# Patient Record
Sex: FEMALE | URBAN - METROPOLITAN AREA
[De-identification: names, ages, dates, MRNs, and addresses within clinical notes are randomized per-mention and may not be internally consistent; named-entity substitution may affect disease eponyms.]

---

## 2018-08-21 ENCOUNTER — OFFICE VISIT (OUTPATIENT)
Dept: URBAN - METROPOLITAN AREA CLINIC 76 | Facility: CLINIC | Age: 78
End: 2018-08-21
Payer: MEDICARE

## 2018-08-21 PROCEDURE — 92133 CPTRZD OPH DX IMG PST SGM ON: CPT | Performed by: OPHTHALMOLOGY

## 2018-08-21 PROCEDURE — 92083 EXTENDED VISUAL FIELD XM: CPT | Performed by: OPHTHALMOLOGY

## 2018-08-21 PROCEDURE — 92014 COMPRE OPH EXAM EST PT 1/>: CPT | Performed by: OPHTHALMOLOGY

## 2018-08-21 ASSESSMENT — INTRAOCULAR PRESSURE
OD: 14
OS: 13

## 2018-08-21 NOTE — IMPRESSION/PLAN
Impression: Diagnosis: Ocular hypertension, bilateral. Code: H40.053. IOP OU ok. Pt using Timolol solution, no GFS. VF and OCT reviewed, stable compared to last. Plan: Continue to monitor. Continue Timolol (Gel) QD OU, Brimonidine BID OU. Emphasized compliance. Advised pt to check with pharmacy regarding Timolol GFS.

## 2019-02-12 ENCOUNTER — OFFICE VISIT (OUTPATIENT)
Dept: URBAN - METROPOLITAN AREA CLINIC 76 | Facility: CLINIC | Age: 79
End: 2019-02-12
Payer: MEDICARE

## 2019-02-12 DIAGNOSIS — Z96.1 PRESENCE OF INTRAOCULAR LENS: ICD-10-CM

## 2019-02-12 PROCEDURE — 92012 INTRM OPH EXAM EST PATIENT: CPT | Performed by: OPHTHALMOLOGY

## 2019-02-12 ASSESSMENT — INTRAOCULAR PRESSURE
OD: 16
OS: 16

## 2019-02-12 NOTE — IMPRESSION/PLAN
Impression: Diagnosis: Ocular hypertension, bilateral. Code: H40.053. IOP OU higher today. Plan: Continue to monitor. Continue Timolol (Gel) QD OU, confirmed with patient she is taking Timolol GFS. advised patient to take  Brimonidine BID OU. Advised medication compliance. Will transfer care to Optometrist if stable at next visit.

## 2019-08-12 ENCOUNTER — OFFICE VISIT (OUTPATIENT)
Dept: URBAN - METROPOLITAN AREA CLINIC 76 | Facility: CLINIC | Age: 79
End: 2019-08-12
Payer: MEDICARE

## 2019-08-12 PROCEDURE — 92014 COMPRE OPH EXAM EST PT 1/>: CPT | Performed by: OPHTHALMOLOGY

## 2019-08-12 PROCEDURE — 92083 EXTENDED VISUAL FIELD XM: CPT | Performed by: OPHTHALMOLOGY

## 2019-08-12 PROCEDURE — 92133 CPTRZD OPH DX IMG PST SGM ON: CPT | Performed by: OPHTHALMOLOGY

## 2019-08-12 ASSESSMENT — VISUAL ACUITY
OS: 20/30
OD: 20/60

## 2019-08-12 ASSESSMENT — INTRAOCULAR PRESSURE
OS: 14
OD: 13

## 2019-08-12 NOTE — IMPRESSION/PLAN
Impression: Diagnosis: Ocular hypertension, bilateral. Code: H40.053. IOP OU controlled on current regimen. No changes needed. OCT stable compared to last. VF stable compared to last. Plan: Continue to monitor. Continue Timolol (Gel) QD OU, 
again advised patient to take Brimonidine BID OU. Advised medication compliance.  
Will transfer care to Optometrist

## 2020-05-26 ENCOUNTER — OFFICE VISIT (OUTPATIENT)
Dept: URBAN - METROPOLITAN AREA CLINIC 76 | Facility: CLINIC | Age: 80
End: 2020-05-26
Payer: MEDICARE

## 2020-05-26 DIAGNOSIS — H04.123 DRY EYE SYNDROME OF BILATERAL LACRIMAL GLANDS: ICD-10-CM

## 2020-05-26 DIAGNOSIS — H52.4 PRESBYOPIA: ICD-10-CM

## 2020-05-26 PROCEDURE — 92002 INTRM OPH EXAM NEW PATIENT: CPT | Performed by: OPTOMETRIST

## 2020-05-26 PROCEDURE — 92012 INTRM OPH EXAM EST PATIENT: CPT | Performed by: OPTOMETRIST

## 2020-05-26 RX ORDER — ALPRAZOLAM 0.5 MG/1
0.5 MG TABLET ORAL
Refills: 0 | Status: ACTIVE
Start: 2020-05-26

## 2020-05-26 ASSESSMENT — INTRAOCULAR PRESSURE
OS: 12
OD: 12

## 2020-05-26 NOTE — IMPRESSION/PLAN
Impression: Diagnosis: Ocular hypertension, bilateral. Code: H40.053. IOP OU controlled on current regimen. No changes needed. Plan: Continue to monitor. Continue Timolol (Gel) QD OU and Brimonidine BID OU. Advised medication compliance. Needs updated tests.

## 2020-05-26 NOTE — IMPRESSION/PLAN
Impression: Presbyopia: H52.4. Bilateral. Plan: Recommend updated mrx. Advised pt she does not meet legal driving requirements.

## 2020-12-28 ENCOUNTER — OFFICE VISIT (OUTPATIENT)
Dept: URBAN - METROPOLITAN AREA CLINIC 76 | Facility: CLINIC | Age: 80
End: 2020-12-28
Payer: MEDICARE

## 2020-12-28 DIAGNOSIS — H40.053 OCULAR HYPERTENSION, BILATERAL: Primary | ICD-10-CM

## 2020-12-28 PROCEDURE — 92083 EXTENDED VISUAL FIELD XM: CPT | Performed by: OPTOMETRIST

## 2020-12-28 PROCEDURE — 92133 CPTRZD OPH DX IMG PST SGM ON: CPT | Performed by: OPTOMETRIST

## 2020-12-28 PROCEDURE — 99213 OFFICE O/P EST LOW 20 MIN: CPT | Performed by: OPTOMETRIST

## 2020-12-28 ASSESSMENT — INTRAOCULAR PRESSURE
OD: 17
OS: 17

## 2020-12-28 NOTE — IMPRESSION/PLAN
Impression: Diagnosis: Ocular hypertension, bilateral. Code: H40.053. IOP high today, pt has not used Brimonidine yet today. OCT 12/28/20: Mild RNFL thinning OU, stable OU. VF 12/28/20: EHFP, unreliable OD. possible mild sup/inf nasal step OS. Dr. Brenner Eis on medical leave. Plan: Continue to monitor. Advised pt to use Timolol GFS QAM OU and continue Brimonidine BID OU. Advised medication compliance.

## 2021-04-28 ENCOUNTER — OFFICE VISIT (OUTPATIENT)
Dept: URBAN - METROPOLITAN AREA CLINIC 76 | Facility: CLINIC | Age: 81
End: 2021-04-28
Payer: MEDICARE

## 2021-04-28 PROCEDURE — 99213 OFFICE O/P EST LOW 20 MIN: CPT | Performed by: OPTOMETRIST

## 2021-04-28 RX ORDER — TIMOLOL MALEATE 5 MG/ML
0.5 % SOLUTION, GEL FORMING, EXTENDED RELEASE OPHTHALMIC
Qty: 15 | Refills: 3 | Status: INACTIVE
Start: 2021-04-28 | End: 2022-02-04

## 2021-04-28 ASSESSMENT — INTRAOCULAR PRESSURE
OS: 13
OD: 15

## 2021-04-28 NOTE — IMPRESSION/PLAN
Impression: Subluxation of lens, right eye: H27.111. Right. Plan: Discussed with patient. Recommend retina consult.

## 2021-04-28 NOTE — IMPRESSION/PLAN
Impression: Diagnosis: Ocular hypertension, bilateral. Code: H40.053. IOP better today. Bilateral. Plan: Discussed with patient. Continue to monitor. Continue Brimonidine BID OU and increase Timolol GFS BID OU instead of QAM OU. Advised medication compliance.

## 2021-06-18 ENCOUNTER — OFFICE VISIT (OUTPATIENT)
Dept: URBAN - METROPOLITAN AREA CLINIC 76 | Facility: CLINIC | Age: 81
End: 2021-06-18
Payer: MEDICARE

## 2021-06-18 DIAGNOSIS — H27.111 SUBLUXATION OF LENS, RIGHT EYE: Primary | ICD-10-CM

## 2021-06-18 DIAGNOSIS — H26.493 OTHER SECONDARY CATARACT, BILATERAL: ICD-10-CM

## 2021-06-18 PROCEDURE — 92134 CPTRZ OPH DX IMG PST SGM RTA: CPT | Performed by: OPHTHALMOLOGY

## 2021-06-18 PROCEDURE — 99213 OFFICE O/P EST LOW 20 MIN: CPT | Performed by: OPHTHALMOLOGY

## 2021-06-18 ASSESSMENT — INTRAOCULAR PRESSURE
OS: 11
OD: 12

## 2021-06-18 NOTE — IMPRESSION/PLAN
Impression: Subluxation of lens, right eye: H27.111. Plan: OCT ordered and performed today. The clinical exam is consistent with a Dislocated intraocular lens. Surgical IOL repositioning or exchange is recommended, however the lens is not affecting the patient's vision at this time. Surgical R/B/A were discussed. The patient understands the potential risks of sx, including (but not limited to) bleeding, pain, infection, loss of vision, loss of eye and the possible need for more surgery, The patient elects to observe at this time.

## 2021-06-18 NOTE — IMPRESSION/PLAN
Impression: Other secondary cataract, bilateral: H26.493. Bilateral. Plan: Recommend observation at this time.

## 2021-10-19 ENCOUNTER — OFFICE VISIT (OUTPATIENT)
Dept: URBAN - METROPOLITAN AREA CLINIC 76 | Facility: CLINIC | Age: 81
End: 2021-10-19
Payer: MEDICARE

## 2021-10-19 PROCEDURE — 99212 OFFICE O/P EST SF 10 MIN: CPT | Performed by: OPTOMETRIST

## 2021-10-19 ASSESSMENT — INTRAOCULAR PRESSURE
OS: 13
OD: 12

## 2021-10-19 NOTE — IMPRESSION/PLAN
Impression: Diagnosis: Ocular hypertension, bilateral. Code: H40.053. IOP stable today. Bilateral. Plan: Discussed with patient. Patient is still only using each drop once a day, advised she can use Timolol GFS QAM OU but Brimonidine needs to be BID OU. Advised medication compliance.

## 2022-04-20 ENCOUNTER — OFFICE VISIT (OUTPATIENT)
Dept: URBAN - METROPOLITAN AREA CLINIC 76 | Facility: CLINIC | Age: 82
End: 2022-04-20
Payer: MEDICARE

## 2022-04-20 DIAGNOSIS — H26.493 OTHER SECONDARY CATARACT, BILATERAL: ICD-10-CM

## 2022-04-20 DIAGNOSIS — H40.053 OCULAR HYPERTENSION, BILATERAL: Primary | ICD-10-CM

## 2022-04-20 PROCEDURE — 92083 EXTENDED VISUAL FIELD XM: CPT | Performed by: OPTOMETRIST

## 2022-04-20 PROCEDURE — 99214 OFFICE O/P EST MOD 30 MIN: CPT | Performed by: OPTOMETRIST

## 2022-04-20 PROCEDURE — 92133 CPTRZD OPH DX IMG PST SGM ON: CPT | Performed by: OPTOMETRIST

## 2022-04-20 RX ORDER — LATANOPROST 50 UG/ML
0.005 % SOLUTION OPHTHALMIC
Qty: 2.5 | Refills: 0 | Status: ACTIVE
Start: 2022-04-20

## 2022-04-20 ASSESSMENT — INTRAOCULAR PRESSURE
OD: 22
OS: 20

## 2022-04-20 NOTE — IMPRESSION/PLAN
Impression: Diagnosis: Ocular hypertension, bilateral. Code: H40.053. IOP stable today. Bilateral. Preformed and reviewed VF and RNFL OCT. VF OD: possible early inf nasal step, OS: normal. RNFL OCT: mild NFL thinning sup OU. Plan: Discussed with patient. Continue Timolol GFS QAM OU. D/c Brimonidine. Start Latanoprost QHS OU. Advised medication compliance. Apply pressure to tear ducts after instilling drops, then close eyes and roll eyes around for 10 sec. Wait 5 min between drops.

## 2022-05-17 ENCOUNTER — OFFICE VISIT (OUTPATIENT)
Dept: URBAN - METROPOLITAN AREA CLINIC 76 | Facility: CLINIC | Age: 82
End: 2022-05-17
Payer: MEDICARE

## 2022-05-17 PROCEDURE — 99212 OFFICE O/P EST SF 10 MIN: CPT | Performed by: OPTOMETRIST

## 2022-05-17 RX ORDER — LATANOPROST 50 UG/ML
0.005 % SOLUTION OPHTHALMIC
Qty: 7.5 | Refills: 3 | Status: ACTIVE
Start: 2022-05-17

## 2022-05-17 ASSESSMENT — INTRAOCULAR PRESSURE
OD: 14
OS: 13

## 2022-05-17 NOTE — IMPRESSION/PLAN
Impression: Diagnosis: Ocular hypertension, bilateral. Code: H40.053. IOP stable today lower today after D/C Brimonidine and starting Latanoprost. Plan: Continue Timolol GFS QAM OU and Latanoprost QHS OU.

## 2022-05-26 ENCOUNTER — OFFICE VISIT (OUTPATIENT)
Dept: URBAN - METROPOLITAN AREA CLINIC 81 | Facility: CLINIC | Age: 82
End: 2022-05-26
Payer: MEDICARE

## 2022-05-26 DIAGNOSIS — H35.81 RETINAL EDEMA: Primary | ICD-10-CM

## 2022-05-26 DIAGNOSIS — H26.492 OTHER SECONDARY CATARACT, LEFT EYE: ICD-10-CM

## 2022-05-26 DIAGNOSIS — H18.513 ENDOTHELIAL CORNEAL DYSTROPHY, BILATERAL: ICD-10-CM

## 2022-05-26 DIAGNOSIS — H40.053 OCULAR HYPERTENSION, BILATERAL: ICD-10-CM

## 2022-05-26 PROCEDURE — 99214 OFFICE O/P EST MOD 30 MIN: CPT | Performed by: OPTOMETRIST

## 2022-05-26 PROCEDURE — 92134 CPTRZ OPH DX IMG PST SGM RTA: CPT | Performed by: OPTOMETRIST

## 2022-05-26 RX ORDER — PREDNISOLONE ACETATE 10 MG/ML
1 % SUSPENSION/ DROPS OPHTHALMIC
Qty: 5 | Refills: 0 | Status: ACTIVE
Start: 2022-05-26

## 2022-05-26 RX ORDER — KETOROLAC TROMETHAMINE 5 MG/ML
0.5 % SOLUTION OPHTHALMIC
Qty: 5 | Refills: 1 | Status: ACTIVE
Start: 2022-05-26

## 2022-05-26 ASSESSMENT — INTRAOCULAR PRESSURE
OS: 13
OD: 13

## 2022-05-26 ASSESSMENT — KERATOMETRY
OS: 42.75
OD: 43.25

## 2022-05-26 NOTE — IMPRESSION/PLAN
Impression: Diagnosis: Ocular hypertension, bilateral. Code: H40.053. 

-IOP stable today
-04/22/2022 OCT RNFL, thinning superior OU Plan: Discussed. Start using Timolol gel forming solution BID OU for now. Continue punctal occlusion after drop instillation as directed. D/C Latanoprost for now.

## 2022-05-26 NOTE — IMPRESSION/PLAN
Impression: Other secondary cataract, left eye: H26.492. Plan: Discussed diagnosis with patient in detail. No treatment is required at this time. Will continue to observe condition and/or symptoms. Patient instructed to call if condition gets worse.

## 2022-05-26 NOTE — IMPRESSION/PLAN
Impression: Retinal edema: H35.81.

-Macular edema OS 
-05/26/2022 Ordered OCT MAC; OD stable, OS Edema. Plan: Discussed diagnosis with patient in detail. Start Prednisolone OS QID and Ketorolac OS QID. Steroid precautions reviewed. Will continue to observe condition and/or symptoms. Patient instructed to call if condition gets worse. Dispensed written instructions of drop Regiment to patient.

## 2022-05-26 NOTE — IMPRESSION/PLAN
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 Plan: Discussed, monitor.

## 2022-06-06 ENCOUNTER — OFFICE VISIT (OUTPATIENT)
Dept: URBAN - METROPOLITAN AREA CLINIC 76 | Facility: CLINIC | Age: 82
End: 2022-06-06
Payer: MEDICARE

## 2022-06-06 DIAGNOSIS — H18.513 ENDOTHELIAL CORNEAL DYSTROPHY, BILATERAL: ICD-10-CM

## 2022-06-06 DIAGNOSIS — H35.81 RETINAL EDEMA: Primary | ICD-10-CM

## 2022-06-06 DIAGNOSIS — H40.053 OCULAR HYPERTENSION, BILATERAL: ICD-10-CM

## 2022-06-06 PROCEDURE — 99213 OFFICE O/P EST LOW 20 MIN: CPT | Performed by: OPTOMETRIST

## 2022-06-06 PROCEDURE — 92134 CPTRZ OPH DX IMG PST SGM RTA: CPT | Performed by: OPTOMETRIST

## 2022-06-06 RX ORDER — DORZOLAMIDE HCL 20 MG/ML
2 % SOLUTION/ DROPS OPHTHALMIC
Qty: 5 | Refills: 0 | Status: ACTIVE
Start: 2022-06-06

## 2022-06-06 ASSESSMENT — INTRAOCULAR PRESSURE
OS: 21
OD: 24
OS: 24
OD: 21

## 2022-06-06 NOTE — IMPRESSION/PLAN
Impression: Diagnosis: Ocular hypertension, bilateral. Code: H40.053. 

-IOP elevated OU today, taken off Latanoprost 5/26/22 due to retinal edema.
-04/22/2022 OCT RNFL, thinning superior OU Plan: Discussed. Continue using Timolol gel forming solution BID OU for now, start Dorzolamide BID OU. If effective may consider Cosopt in the future. Continue punctal occlusion after drop instillation as directed.

## 2022-06-06 NOTE — IMPRESSION/PLAN
Impression: Retinal edema: H35.81.

-Macular edema OS 
-06/6/2022 Ordered OCT MAC; OD stable, OS Edema stable. Plan: Discussed diagnosis with patient in detail. Continue Prednisolone OS QID and Ketorolac OS QID. Steroid precautions reviewed. Will continue to observe condition and/or symptoms. Patient instructed to call if condition gets worse. Dispensed written instructions of drop Regiment to patient.

## 2022-06-20 ENCOUNTER — OFFICE VISIT (OUTPATIENT)
Dept: URBAN - METROPOLITAN AREA CLINIC 76 | Facility: CLINIC | Age: 82
End: 2022-06-20
Payer: MEDICARE

## 2022-06-20 DIAGNOSIS — H40.053 OCULAR HYPERTENSION, BILATERAL: ICD-10-CM

## 2022-06-20 DIAGNOSIS — H35.81 RETINAL EDEMA: Primary | ICD-10-CM

## 2022-06-20 PROCEDURE — 99213 OFFICE O/P EST LOW 20 MIN: CPT | Performed by: OPTOMETRIST

## 2022-06-20 PROCEDURE — 92134 CPTRZ OPH DX IMG PST SGM RTA: CPT | Performed by: OPTOMETRIST

## 2022-06-20 ASSESSMENT — INTRAOCULAR PRESSURE
OD: 19
OS: 18

## 2022-06-20 NOTE — IMPRESSION/PLAN
Impression: Diagnosis: Ocular hypertension, bilateral. Code: H40.053. 

-IOP elevated OU today, taken off Latanoprost 5/26/22 due to retinal edema.
-04/22/2022 OCT RNFL, thinning superior OU Plan: Discussed. Continue using Timolol gel forming solution BID OU and Dorzolamide BID OU. If effective may consider Dorzolamide/Timolol combo in the future. Continue punctal occlusion after drop instillation as directed.

## 2022-06-20 NOTE — IMPRESSION/PLAN
Impression: Retinal edema: H35.81.

-Macular edema Resolved
-06/20/2022 Ordered OCT MAC; OD stable, OS Edema resolved. Plan: Discussed diagnosis with patient in detail. Continue Prednisolone OS tid x 1 week, bid x qd x 1 week than D/C and Ketorolac OS QID X 2 weeks than D/C. Steroid precautions reviewed. Will continue to observe condition and/or symptoms. Patient instructed to call if condition gets worse. Dispensed written instructions of drop Regiment to patient.

## 2022-08-01 ENCOUNTER — OFFICE VISIT (OUTPATIENT)
Dept: URBAN - METROPOLITAN AREA CLINIC 76 | Facility: CLINIC | Age: 82
End: 2022-08-01
Payer: MEDICARE

## 2022-08-01 DIAGNOSIS — H40.053 OCULAR HYPERTENSION, BILATERAL: ICD-10-CM

## 2022-08-01 DIAGNOSIS — H35.81 RETINAL EDEMA: Primary | ICD-10-CM

## 2022-08-01 PROCEDURE — 92134 CPTRZ OPH DX IMG PST SGM RTA: CPT | Performed by: OPTOMETRIST

## 2022-08-01 PROCEDURE — 99213 OFFICE O/P EST LOW 20 MIN: CPT | Performed by: OPTOMETRIST

## 2022-08-01 RX ORDER — DORZOLAMIDE HYDROCHLORIDE AND TIMOLOL MALEATE 20; 5 MG/ML; MG/ML
SOLUTION/ DROPS OPHTHALMIC
Qty: 5 | Refills: 0 | Status: ACTIVE
Start: 2022-08-01

## 2022-08-01 ASSESSMENT — INTRAOCULAR PRESSURE
OS: 19
OD: 19

## 2022-08-01 NOTE — IMPRESSION/PLAN
Impression: Diagnosis: Ocular hypertension, bilateral. Code: H40.053. 

-IOP elevated OU today, taken off Latanoprost 5/26/22 due to retinal edema.
-04/22/2022 OCT RNFL, thinning superior OU Plan: Discussed. Continue using Timolol gel forming solution BID OU and Dorzolamide BID OU. Discussed Cosopt. will ERx and if too expensive then just continue with Timolol GFS BID OU and Dorzolamide BID OU in separate bottles. If effective may consider Dorzolamide/Timolol combo in the future. Continue punctal occlusion after drop instillation as directed. If stable OU then okay to consider 6 mos appts.

## 2022-08-01 NOTE — IMPRESSION/PLAN
Impression: Retinal edema: H35.81.

-Macular edema Resolved
-08/1/2022 Mac OCT: Flat no edema OU Plan: Discussed diagnosis with patient in detail. Will continue to observe condition and/or symptoms. Patient instructed to call if condition gets worse. Dispensed written instructions of drop Regiment to patient.

## 2022-12-05 ENCOUNTER — OFFICE VISIT (OUTPATIENT)
Dept: URBAN - METROPOLITAN AREA CLINIC 76 | Facility: CLINIC | Age: 82
End: 2022-12-05
Payer: MEDICARE

## 2022-12-05 DIAGNOSIS — H40.053 OCULAR HYPERTENSION, BILATERAL: Primary | ICD-10-CM

## 2022-12-05 PROCEDURE — 99213 OFFICE O/P EST LOW 20 MIN: CPT | Performed by: OPTOMETRIST

## 2022-12-05 RX ORDER — DORZOLAMIDE HYDROCHLORIDE AND TIMOLOL MALEATE 20; 5 MG/ML; MG/ML
SOLUTION/ DROPS OPHTHALMIC
Qty: 10 | Refills: 4 | Status: ACTIVE
Start: 2022-12-05

## 2022-12-05 ASSESSMENT — INTRAOCULAR PRESSURE
OD: 19
OS: 20

## 2022-12-05 NOTE — IMPRESSION/PLAN
Impression: Diagnosis: Ocular hypertension, bilateral. Code: H40.053. 
-IOP stable OU
-taken off Latanoprost 5/26/22 due to retinal edema.
-04/22/2022 OCT RNFL, thinning superior OU Plan: Discussed. D/C Brimonidine gtt and start Dorzolamide/Timolol OU BID. Dispensed written instructions to patient today on how to use drops. Monitor.

## 2023-04-14 ENCOUNTER — OFFICE VISIT (OUTPATIENT)
Dept: URBAN - METROPOLITAN AREA CLINIC 76 | Facility: CLINIC | Age: 83
End: 2023-04-14
Payer: MEDICARE

## 2023-04-14 DIAGNOSIS — H40.053 OCULAR HYPERTENSION, BILATERAL: Primary | ICD-10-CM

## 2023-04-14 PROCEDURE — 92133 CPTRZD OPH DX IMG PST SGM ON: CPT | Performed by: OPTOMETRIST

## 2023-04-14 PROCEDURE — 99213 OFFICE O/P EST LOW 20 MIN: CPT | Performed by: OPTOMETRIST

## 2023-04-14 RX ORDER — DORZOLAMIDE HYDROCHLORIDE AND TIMOLOL MALEATE 20; 5 MG/ML; MG/ML
SOLUTION/ DROPS OPHTHALMIC
Qty: 10 | Refills: 4 | Status: ACTIVE
Start: 2023-04-14

## 2023-04-14 ASSESSMENT — INTRAOCULAR PRESSURE
OD: 25
OS: 14

## 2023-04-14 NOTE — IMPRESSION/PLAN
Impression: Diagnosis: Ocular hypertension, bilateral. Code: H40.053. 
-IOP stable OS, higher in the OD 
-taken off Latanoprost 5/26/22 due to retinal edema.
-04/14/23 OCT RNFL, stable OU, from previous results Plan: Discussed. Continue Dorzolamide/Timolol OU BID. Explained to patient and  to use the drop OU not just OS. Monitor.

## 2023-11-09 ENCOUNTER — OFFICE VISIT (OUTPATIENT)
Dept: URBAN - METROPOLITAN AREA CLINIC 81 | Facility: CLINIC | Age: 83
End: 2023-11-09
Payer: MEDICARE

## 2023-11-09 DIAGNOSIS — H27.111 SUBLUXATION OF LENS, RIGHT EYE: ICD-10-CM

## 2023-11-09 DIAGNOSIS — Z96.1 PRESENCE OF INTRAOCULAR LENS: ICD-10-CM

## 2023-11-09 DIAGNOSIS — H52.4 PRESBYOPIA: ICD-10-CM

## 2023-11-09 DIAGNOSIS — H40.1131 PRIMARY OPEN-ANGLE GLAUCOMA, BILATERAL, MILD STAGE: Primary | ICD-10-CM

## 2023-11-09 PROCEDURE — 99214 OFFICE O/P EST MOD 30 MIN: CPT | Performed by: OPTOMETRIST

## 2023-11-09 PROCEDURE — 92083 EXTENDED VISUAL FIELD XM: CPT | Performed by: OPTOMETRIST

## 2023-11-09 ASSESSMENT — INTRAOCULAR PRESSURE
OS: 12
OD: 15

## 2023-11-09 ASSESSMENT — VISUAL ACUITY
OS: 20/30
OD: 20/30

## 2024-05-01 ENCOUNTER — OFFICE VISIT (OUTPATIENT)
Dept: URBAN - METROPOLITAN AREA CLINIC 76 | Facility: CLINIC | Age: 84
End: 2024-05-01
Payer: MEDICARE

## 2024-05-01 DIAGNOSIS — Z96.1 PRESENCE OF INTRAOCULAR LENS: ICD-10-CM

## 2024-05-01 DIAGNOSIS — H40.1131 PRIMARY OPEN-ANGLE GLAUCOMA, BILATERAL, MILD STAGE: Primary | ICD-10-CM

## 2024-05-01 PROCEDURE — 99213 OFFICE O/P EST LOW 20 MIN: CPT | Performed by: OPTOMETRIST

## 2024-05-01 RX ORDER — DORZOLAMIDE HYDROCHLORIDE AND TIMOLOL MALEATE 20; 5 MG/ML; MG/ML
SOLUTION/ DROPS OPHTHALMIC
Qty: 15 | Refills: 4 | Status: ACTIVE
Start: 2024-05-01

## 2024-05-01 ASSESSMENT — INTRAOCULAR PRESSURE
OS: 12
OD: 12

## 2024-11-13 ENCOUNTER — OFFICE VISIT (OUTPATIENT)
Dept: URBAN - METROPOLITAN AREA CLINIC 76 | Facility: CLINIC | Age: 84
End: 2024-11-13
Payer: MEDICARE

## 2024-11-13 DIAGNOSIS — Z96.1 PRESENCE OF INTRAOCULAR LENS: ICD-10-CM

## 2024-11-13 DIAGNOSIS — H40.1131 PRIMARY OPEN-ANGLE GLAUCOMA, BILATERAL, MILD STAGE: Primary | ICD-10-CM

## 2024-11-13 PROCEDURE — 99213 OFFICE O/P EST LOW 20 MIN: CPT | Performed by: OPTOMETRIST

## 2024-11-13 PROCEDURE — 92133 CPTRZD OPH DX IMG PST SGM ON: CPT | Performed by: OPTOMETRIST

## 2024-11-13 RX ORDER — DORZOLAMIDE HYDROCHLORIDE AND TIMOLOL MALEATE 20; 5 MG/ML; MG/ML
SOLUTION/ DROPS OPHTHALMIC
Qty: 15 | Refills: 4 | Status: ACTIVE
Start: 2024-11-13

## 2024-11-13 ASSESSMENT — INTRAOCULAR PRESSURE
OD: 16
OS: 14